# Patient Record
Sex: FEMALE | Race: WHITE | NOT HISPANIC OR LATINO | Employment: STUDENT | URBAN - METROPOLITAN AREA
[De-identification: names, ages, dates, MRNs, and addresses within clinical notes are randomized per-mention and may not be internally consistent; named-entity substitution may affect disease eponyms.]

---

## 2017-08-01 ENCOUNTER — HOSPITAL ENCOUNTER (EMERGENCY)
Facility: HOSPITAL | Age: 22
Discharge: HOME | End: 2017-08-02
Attending: EMERGENCY MEDICINE | Admitting: EMERGENCY MEDICINE

## 2017-08-01 DIAGNOSIS — R10.31 RLQ ABDOMINAL PAIN: Primary | ICD-10-CM

## 2017-08-01 DIAGNOSIS — N83.201 CYST OF RIGHT OVARY: ICD-10-CM

## 2017-08-01 LAB
ALBUMIN SERPL-MCNC: 4.4 G/DL (ref 3.2–4.8)
ALBUMIN/GLOB SERPL: 1.4 G/DL (ref 1.5–2.5)
ALP SERPL-CCNC: 62 U/L (ref 25–100)
ALT SERPL W P-5'-P-CCNC: 32 U/L (ref 7–40)
ANION GAP SERPL CALCULATED.3IONS-SCNC: 6 MMOL/L (ref 3–11)
AST SERPL-CCNC: 64 U/L (ref 0–33)
BASOPHILS # BLD AUTO: 0.03 10*3/MM3 (ref 0–0.2)
BASOPHILS NFR BLD AUTO: 0.3 % (ref 0–1)
BILIRUB SERPL-MCNC: 0.8 MG/DL (ref 0.3–1.2)
BUN BLD-MCNC: 8 MG/DL (ref 9–23)
BUN/CREAT SERPL: 13.3 (ref 7–25)
CALCIUM SPEC-SCNC: 9.1 MG/DL (ref 8.7–10.4)
CHLORIDE SERPL-SCNC: 103 MMOL/L (ref 99–109)
CO2 SERPL-SCNC: 29 MMOL/L (ref 20–31)
CREAT BLD-MCNC: 0.6 MG/DL (ref 0.6–1.3)
DEPRECATED RDW RBC AUTO: 47.9 FL (ref 37–54)
EOSINOPHIL # BLD AUTO: 0.09 10*3/MM3 (ref 0–0.3)
EOSINOPHIL NFR BLD AUTO: 0.8 % (ref 0–3)
ERYTHROCYTE [DISTWIDTH] IN BLOOD BY AUTOMATED COUNT: 15.3 % (ref 11.3–14.5)
GFR SERPL CREATININE-BSD FRML MDRD: 125 ML/MIN/1.73
GLOBULIN UR ELPH-MCNC: 3.2 GM/DL
GLUCOSE BLD-MCNC: 78 MG/DL (ref 70–100)
HCT VFR BLD AUTO: 40.6 % (ref 34.5–44)
HGB BLD-MCNC: 13.3 G/DL (ref 11.5–15.5)
IMM GRANULOCYTES # BLD: 0.03 10*3/MM3 (ref 0–0.03)
IMM GRANULOCYTES NFR BLD: 0.3 % (ref 0–0.6)
LIPASE SERPL-CCNC: 29 U/L (ref 6–51)
LYMPHOCYTES # BLD AUTO: 3.18 10*3/MM3 (ref 0.6–4.8)
LYMPHOCYTES NFR BLD AUTO: 29.8 % (ref 24–44)
MCH RBC QN AUTO: 28 PG (ref 27–31)
MCHC RBC AUTO-ENTMCNC: 32.8 G/DL (ref 32–36)
MCV RBC AUTO: 85.5 FL (ref 80–99)
MONOCYTES # BLD AUTO: 0.69 10*3/MM3 (ref 0–1)
MONOCYTES NFR BLD AUTO: 6.5 % (ref 0–12)
NEUTROPHILS # BLD AUTO: 6.64 10*3/MM3 (ref 1.5–8.3)
NEUTROPHILS NFR BLD AUTO: 62.3 % (ref 41–71)
PLATELET # BLD AUTO: 339 10*3/MM3 (ref 150–450)
PMV BLD AUTO: 9 FL (ref 6–12)
POTASSIUM BLD-SCNC: 3.5 MMOL/L (ref 3.5–5.5)
PROT SERPL-MCNC: 7.6 G/DL (ref 5.7–8.2)
RBC # BLD AUTO: 4.75 10*6/MM3 (ref 3.89–5.14)
SODIUM BLD-SCNC: 138 MMOL/L (ref 132–146)
WBC NRBC COR # BLD: 10.66 10*3/MM3 (ref 3.5–10.8)

## 2017-08-01 PROCEDURE — 85025 COMPLETE CBC W/AUTO DIFF WBC: CPT | Performed by: EMERGENCY MEDICINE

## 2017-08-01 PROCEDURE — 80053 COMPREHEN METABOLIC PANEL: CPT | Performed by: EMERGENCY MEDICINE

## 2017-08-01 PROCEDURE — 99283 EMERGENCY DEPT VISIT LOW MDM: CPT

## 2017-08-01 PROCEDURE — 83690 ASSAY OF LIPASE: CPT | Performed by: EMERGENCY MEDICINE

## 2017-08-01 RX ORDER — SODIUM CHLORIDE 0.9 % (FLUSH) 0.9 %
10 SYRINGE (ML) INJECTION AS NEEDED
Status: DISCONTINUED | OUTPATIENT
Start: 2017-08-01 | End: 2017-08-02 | Stop reason: HOSPADM

## 2017-08-02 ENCOUNTER — APPOINTMENT (OUTPATIENT)
Dept: CT IMAGING | Facility: HOSPITAL | Age: 22
End: 2017-08-02

## 2017-08-02 VITALS
RESPIRATION RATE: 16 BRPM | WEIGHT: 160 LBS | HEIGHT: 68 IN | BODY MASS INDEX: 24.25 KG/M2 | TEMPERATURE: 97.9 F | DIASTOLIC BLOOD PRESSURE: 69 MMHG | HEART RATE: 61 BPM | SYSTOLIC BLOOD PRESSURE: 112 MMHG | OXYGEN SATURATION: 100 %

## 2017-08-02 LAB
B-HCG UR QL: NEGATIVE
BILIRUB UR QL STRIP: NEGATIVE
CLARITY UR: CLEAR
COLOR UR: YELLOW
GLUCOSE UR STRIP-MCNC: NEGATIVE MG/DL
HGB UR QL STRIP.AUTO: NEGATIVE
HOLD SPECIMEN: NORMAL
HOLD SPECIMEN: NORMAL
INTERNAL NEGATIVE CONTROL: NORMAL
INTERNAL POSITIVE CONTROL: NORMAL
KETONES UR QL STRIP: NEGATIVE
LEUKOCYTE ESTERASE UR QL STRIP.AUTO: NEGATIVE
Lab: NORMAL
NITRITE UR QL STRIP: NEGATIVE
PH UR STRIP.AUTO: 6.5 [PH] (ref 5–8)
PROT UR QL STRIP: NEGATIVE
SP GR UR STRIP: 1.01 (ref 1–1.03)
UROBILINOGEN UR QL STRIP: NORMAL
WHOLE BLOOD HOLD SPECIMEN: NORMAL
WHOLE BLOOD HOLD SPECIMEN: NORMAL

## 2017-08-02 PROCEDURE — 81003 URINALYSIS AUTO W/O SCOPE: CPT | Performed by: EMERGENCY MEDICINE

## 2017-08-02 PROCEDURE — 74176 CT ABD & PELVIS W/O CONTRAST: CPT

## 2017-08-02 RX ORDER — ONDANSETRON 4 MG/1
4 TABLET, ORALLY DISINTEGRATING ORAL EVERY 6 HOURS PRN
Qty: 10 TABLET | Refills: 0 | Status: SHIPPED | OUTPATIENT
Start: 2017-08-02

## 2017-08-02 NOTE — ED PROVIDER NOTES
Subjective   HPI Comments: Laura Reynaga is a 21 y.o. female who presents to the ED w/ c/o abdominal pain. For the past 3 days, she has had intermittent RLQ abdominal pain. She describes the pain as a sharp pain when moving and a dull pain when sitting still. The pain is worse with movement, especially when jumping on her bed, or when her bladder is full, and improves at rest. She has taken Tylenol and Ibuprofen without improvement. This afternoon, she first developed some nausea but went and worked out prior to going to [facility], where she was told to present to the ED. She denies any dysuria, hematuria, vomiting, constipation, or diarrhea. No abdominal surgeries but does have a family history of appendicitis and a personal history of UTI. LMP was last week.     Additionally, she c/o lower back pain that has recently worsened and has a history of back surgery.     Patient is a 21 y.o. female presenting with abdominal pain.   History provided by:  Patient  Abdominal Pain   Pain location:  RLQ  Pain quality: dull and sharp    Pain radiates to:  Does not radiate  Pain severity:  Severe  Onset quality:  Sudden  Duration:  3 days  Timing:  Intermittent  Progression:  Worsening  Chronicity:  New  Relieved by:  Nothing  Worsened by:  Movement  Ineffective treatments:  NSAIDs and acetaminophen  Associated symptoms: nausea    Associated symptoms: no constipation, no diarrhea, no dysuria, no hematuria and no vomiting        Review of Systems   Gastrointestinal: Positive for abdominal pain and nausea. Negative for constipation, diarrhea and vomiting.   Genitourinary: Negative for dysuria and hematuria.   All other systems reviewed and are negative.      History reviewed. No pertinent past medical history.    No Known Allergies    Past Surgical History:   Procedure Laterality Date   • WISDOM TOOTH EXTRACTION         History reviewed. No pertinent family history.    Social History     Social History   • Marital status: Single      Spouse name: N/A   • Number of children: N/A   • Years of education: N/A     Social History Main Topics   • Smoking status: Never Smoker   • Smokeless tobacco: None   • Alcohol use Yes      Comment: social   • Drug use: No   • Sexual activity: Not Asked     Other Topics Concern   • None     Social History Narrative   • None         Objective   Physical Exam   Constitutional: She is oriented to person, place, and time. She appears well-developed and well-nourished. No distress.   HENT:   Head: Normocephalic and atraumatic.   Nose: Nose normal.   Eyes: Conjunctivae are normal. No scleral icterus.   Neck: Normal range of motion. Neck supple.   Cardiovascular: Normal rate, regular rhythm, normal heart sounds and intact distal pulses.    No murmur heard.  Pulmonary/Chest: Effort normal and breath sounds normal. No respiratory distress.   Abdominal: Soft. Bowel sounds are normal. She exhibits no distension and no mass. There is tenderness (mild RLQ tender to palpation). There is no rebound, no guarding and negative Lockwood's sign.   Musculoskeletal: Normal range of motion.   Neurological: She is alert and oriented to person, place, and time.   Skin: Skin is warm and dry. She is not diaphoretic.   Psychiatric: She has a normal mood and affect. Her behavior is normal.   Nursing note and vitals reviewed.      Procedures         ED Course  ED Course   Comment By Time   The patient has no emergent findings here in the ER.  All labs are unremarkable.  Abdominal exam them strict mild tenderness but no surgical signs or rigidity.  CT scan demonstrates nephrolithiasis as well as a right ovarian cyst.  The cyst could be the cause the patient's discomfort.  I did advise the patient that there was no evidence of appendicitis but this was not a ideal study to completely rule out secondary to the interest in evaluating for kidney stones.  Advised her on follow-up and return instructions.  At this point with the patient's mild exam and  no significant lab changes, don't believe further imaging is indicated at this moment.  The patient is to return for any worsening.  Discussed the symptoms with her.  She voices understanding and is happy with the plan. Mahad Quinones MD 08/02 0247                 Recent Results (from the past 24 hour(s))   Comprehensive Metabolic Panel    Collection Time: 08/01/17 10:18 PM   Result Value Ref Range    Glucose 78 70 - 100 mg/dL    BUN 8 (L) 9 - 23 mg/dL    Creatinine 0.60 0.60 - 1.30 mg/dL    Sodium 138 132 - 146 mmol/L    Potassium 3.5 3.5 - 5.5 mmol/L    Chloride 103 99 - 109 mmol/L    CO2 29.0 20.0 - 31.0 mmol/L    Calcium 9.1 8.7 - 10.4 mg/dL    Total Protein 7.6 5.7 - 8.2 g/dL    Albumin 4.40 3.20 - 4.80 g/dL    ALT (SGPT) 32 7 - 40 U/L    AST (SGOT) 64 (H) 0 - 33 U/L    Alkaline Phosphatase 62 25 - 100 U/L    Total Bilirubin 0.8 0.3 - 1.2 mg/dL    eGFR Non African Amer 125 >60 mL/min/1.73    Globulin 3.2 gm/dL    A/G Ratio 1.4 (L) 1.5 - 2.5 g/dL    BUN/Creatinine Ratio 13.3 7.0 - 25.0    Anion Gap 6.0 3.0 - 11.0 mmol/L   Lipase    Collection Time: 08/01/17 10:18 PM   Result Value Ref Range    Lipase 29 6 - 51 U/L   Light Blue Top    Collection Time: 08/01/17 10:18 PM   Result Value Ref Range    Extra Tube hold for add-on    Green Top (Gel)    Collection Time: 08/01/17 10:18 PM   Result Value Ref Range    Extra Tube Hold for add-ons.    Lavender Top    Collection Time: 08/01/17 10:18 PM   Result Value Ref Range    Extra Tube hold for add-on    Gold Top - SST    Collection Time: 08/01/17 10:18 PM   Result Value Ref Range    Extra Tube Hold for add-ons.    CBC Auto Differential    Collection Time: 08/01/17 10:18 PM   Result Value Ref Range    WBC 10.66 3.50 - 10.80 10*3/mm3    RBC 4.75 3.89 - 5.14 10*6/mm3    Hemoglobin 13.3 11.5 - 15.5 g/dL    Hematocrit 40.6 34.5 - 44.0 %    MCV 85.5 80.0 - 99.0 fL    MCH 28.0 27.0 - 31.0 pg    MCHC 32.8 32.0 - 36.0 g/dL    RDW 15.3 (H) 11.3 - 14.5 %    RDW-SD 47.9 37.0 -  54.0 fl    MPV 9.0 6.0 - 12.0 fL    Platelets 339 150 - 450 10*3/mm3    Neutrophil % 62.3 41.0 - 71.0 %    Lymphocyte % 29.8 24.0 - 44.0 %    Monocyte % 6.5 0.0 - 12.0 %    Eosinophil % 0.8 0.0 - 3.0 %    Basophil % 0.3 0.0 - 1.0 %    Immature Grans % 0.3 0.0 - 0.6 %    Neutrophils, Absolute 6.64 1.50 - 8.30 10*3/mm3    Lymphocytes, Absolute 3.18 0.60 - 4.80 10*3/mm3    Monocytes, Absolute 0.69 0.00 - 1.00 10*3/mm3    Eosinophils, Absolute 0.09 0.00 - 0.30 10*3/mm3    Basophils, Absolute 0.03 0.00 - 0.20 10*3/mm3    Immature Grans, Absolute 0.03 0.00 - 0.03 10*3/mm3   Urinalysis With / Culture If Indicated    Collection Time: 08/02/17 12:21 AM   Result Value Ref Range    Color, UA Yellow Yellow, Straw    Appearance, UA Clear Clear    pH, UA 6.5 5.0 - 8.0    Specific Gravity, UA 1.012 1.001 - 1.030    Glucose, UA Negative Negative    Ketones, UA Negative Negative    Bilirubin, UA Negative Negative    Blood, UA Negative Negative    Protein, UA Negative Negative    Leuk Esterase, UA Negative Negative    Nitrite, UA Negative Negative    Urobilinogen, UA 0.2 E.U./dL 0.2 - 1.0 E.U./dL   POCT pregnancy, urine    Collection Time: 08/02/17 12:35 AM   Result Value Ref Range    HCG, Urine, QL Negative Negative    Lot Number [id number]     Internal Positive Control Presumptive Positive     Internal Negative Control Presumptive Negative      Note: In addition to lab results from this visit, the labs listed above may include labs taken at another facility or during a different encounter within the last 24 hours. Please correlate lab times with ED admission and discharge times for further clarification of the services performed during this visit.    CT Abdomen Pelvis Without Contrast   Final Result   Abnormal   1.  Nonobstructing 0.2 cm right renal calculus.  No radiopaque ureteral calculi.   2.  Right ovarian 3.7 cm hypodensity, probable cyst.        THIS DOCUMENT HAS BEEN ELECTRONICALLY SIGNED BY ALLI PATRICIA MD        Vitals:     08/02/17 0135 08/02/17 0140 08/02/17 0159 08/02/17 0200   BP: 123/69   112/67   BP Location:       Patient Position:       Pulse:  67     Resp:       Temp:       TempSrc:       SpO2: 100%  100%    Weight:       Height:         Medications   sodium chloride 0.9 % flush 10 mL (not administered)     ECG/EMG Results (last 24 hours)     ** No results found for the last 24 hours. **          MDM  Number of Diagnoses or Management Options     Amount and/or Complexity of Data Reviewed  Clinical lab tests: reviewed  Tests in the radiology section of CPT®: reviewed  Independent visualization of images, tracings, or specimens: yes        Final diagnoses:   RLQ abdominal pain   Cyst of right ovary       Documentation assistance provided by scribantonio Panda.  Information recorded by the scribe was done at my direction and has been verified and validated by me.     Irma Panda  08/02/17 0031       Mahad Quinones MD  08/02/17 0250